# Patient Record
Sex: MALE | Race: BLACK OR AFRICAN AMERICAN | NOT HISPANIC OR LATINO | Employment: FULL TIME | URBAN - METROPOLITAN AREA
[De-identification: names, ages, dates, MRNs, and addresses within clinical notes are randomized per-mention and may not be internally consistent; named-entity substitution may affect disease eponyms.]

---

## 2019-07-29 ENCOUNTER — TELEPHONE (OUTPATIENT)
Dept: GASTROENTEROLOGY | Facility: AMBULARY SURGERY CENTER | Age: 54
End: 2019-07-29

## 2019-07-29 NOTE — TELEPHONE ENCOUNTER
Called patient lvm that needs to call in and reschedule appointment with Dr Savi Chin on 9/30 at 4:40  Told patient there is availability the following week with Dr Savi Chin

## 2019-10-07 ENCOUNTER — OFFICE VISIT (OUTPATIENT)
Dept: GASTROENTEROLOGY | Facility: AMBULARY SURGERY CENTER | Age: 54
End: 2019-10-07
Payer: COMMERCIAL

## 2019-10-07 VITALS
DIASTOLIC BLOOD PRESSURE: 80 MMHG | WEIGHT: 270 LBS | HEART RATE: 90 BPM | BODY MASS INDEX: 39.99 KG/M2 | RESPIRATION RATE: 16 BRPM | HEIGHT: 69 IN | SYSTOLIC BLOOD PRESSURE: 132 MMHG | TEMPERATURE: 98.9 F

## 2019-10-07 DIAGNOSIS — Z80.0 FAMILY HISTORY OF COLON CANCER: Primary | ICD-10-CM

## 2019-10-07 PROCEDURE — 99203 OFFICE O/P NEW LOW 30 MIN: CPT | Performed by: INTERNAL MEDICINE

## 2019-10-07 RX ORDER — CINNAMON BARK
1 POWDER (GRAM) MISCELLANEOUS DAILY
COMMUNITY

## 2019-10-07 RX ORDER — CHLORAL HYDRATE 500 MG
1 CAPSULE ORAL 2 TIMES DAILY
COMMUNITY

## 2019-10-07 RX ORDER — ALBUTEROL SULFATE 90 UG/1
AEROSOL, METERED RESPIRATORY (INHALATION)
COMMUNITY
Start: 2019-09-19

## 2019-10-07 RX ORDER — AMLODIPINE BESYLATE AND BENAZEPRIL HYDROCHLORIDE 5; 10 MG/1; MG/1
1 CAPSULE ORAL DAILY
COMMUNITY
Start: 2019-09-19

## 2019-10-07 RX ORDER — ATORVASTATIN CALCIUM 40 MG/1
TABLET, FILM COATED ORAL
COMMUNITY
Start: 2019-09-19

## 2019-10-07 RX ORDER — TURMERIC 400 MG
1 CAPSULE ORAL DAILY
COMMUNITY

## 2019-10-07 RX ORDER — DIPHENOXYLATE HYDROCHLORIDE AND ATROPINE SULFATE 2.5; .025 MG/1; MG/1
1 TABLET ORAL DAILY
COMMUNITY
End: 2019-12-16 | Stop reason: ALTCHOICE

## 2019-10-07 NOTE — ASSESSMENT & PLAN NOTE
Patient is at increased risk for colon cancer screening because of family history  Rule out colorectal lesions including polyps or malignancy     -Schedule for colonoscopy  -High-fiber diet     -Patient was given instructions about the colonoscopy prep     -Patient was explained about  the risks and benefits of the procedure  Risks including but not limited to bleeding, infection, perforation were explained in detail  Also explained about less than 100% sensitivity with the exam and other alternatives

## 2019-12-02 ENCOUNTER — ANESTHESIA EVENT (OUTPATIENT)
Dept: GASTROENTEROLOGY | Facility: AMBULARY SURGERY CENTER | Age: 54
End: 2019-12-02

## 2019-12-16 ENCOUNTER — HOSPITAL ENCOUNTER (OUTPATIENT)
Dept: GASTROENTEROLOGY | Facility: AMBULARY SURGERY CENTER | Age: 54
Setting detail: OUTPATIENT SURGERY
Discharge: HOME | End: 2019-12-16
Attending: INTERNAL MEDICINE | Admitting: INTERNAL MEDICINE
Payer: COMMERCIAL

## 2019-12-16 ENCOUNTER — ANESTHESIA (OUTPATIENT)
Dept: GASTROENTEROLOGY | Facility: AMBULARY SURGERY CENTER | Age: 54
End: 2019-12-16

## 2019-12-16 VITALS
TEMPERATURE: 98.2 F | HEART RATE: 90 BPM | RESPIRATION RATE: 18 BRPM | OXYGEN SATURATION: 99 % | DIASTOLIC BLOOD PRESSURE: 68 MMHG | SYSTOLIC BLOOD PRESSURE: 159 MMHG | WEIGHT: 263 LBS | BODY MASS INDEX: 38.95 KG/M2 | HEIGHT: 69 IN

## 2019-12-16 DIAGNOSIS — Z80.0 FAMILY HISTORY OF COLON CANCER: ICD-10-CM

## 2019-12-16 LAB — GLUCOSE SERPL-MCNC: 99 MG/DL (ref 65–140)

## 2019-12-16 PROCEDURE — 45380 COLONOSCOPY AND BIOPSY: CPT | Performed by: INTERNAL MEDICINE

## 2019-12-16 PROCEDURE — 88305 TISSUE EXAM BY PATHOLOGIST: CPT | Performed by: PATHOLOGY

## 2019-12-16 PROCEDURE — 82948 REAGENT STRIP/BLOOD GLUCOSE: CPT

## 2019-12-16 RX ORDER — PROPOFOL 10 MG/ML
INJECTION, EMULSION INTRAVENOUS AS NEEDED
Status: DISCONTINUED | OUTPATIENT
Start: 2019-12-16 | End: 2019-12-16 | Stop reason: SURG

## 2019-12-16 RX ORDER — LIDOCAINE HYDROCHLORIDE 10 MG/ML
INJECTION, SOLUTION EPIDURAL; INFILTRATION; INTRACAUDAL; PERINEURAL AS NEEDED
Status: DISCONTINUED | OUTPATIENT
Start: 2019-12-16 | End: 2019-12-16 | Stop reason: SURG

## 2019-12-16 RX ORDER — SODIUM CHLORIDE, SODIUM LACTATE, POTASSIUM CHLORIDE, CALCIUM CHLORIDE 600; 310; 30; 20 MG/100ML; MG/100ML; MG/100ML; MG/100ML
125 INJECTION, SOLUTION INTRAVENOUS CONTINUOUS
Status: DISCONTINUED | OUTPATIENT
Start: 2019-12-16 | End: 2019-12-20 | Stop reason: HOSPADM

## 2019-12-16 RX ORDER — LABETALOL 20 MG/4 ML (5 MG/ML) INTRAVENOUS SYRINGE
AS NEEDED
Status: DISCONTINUED | OUTPATIENT
Start: 2019-12-16 | End: 2019-12-16 | Stop reason: SURG

## 2019-12-16 RX ADMIN — LIDOCAINE HYDROCHLORIDE 50 MG: 10 INJECTION, SOLUTION EPIDURAL; INFILTRATION; INTRACAUDAL; PERINEURAL at 09:53

## 2019-12-16 RX ADMIN — PROPOFOL 50 MG: 10 INJECTION, EMULSION INTRAVENOUS at 09:54

## 2019-12-16 RX ADMIN — PROPOFOL 50 MG: 10 INJECTION, EMULSION INTRAVENOUS at 09:57

## 2019-12-16 RX ADMIN — PROPOFOL 50 MG: 10 INJECTION, EMULSION INTRAVENOUS at 10:03

## 2019-12-16 RX ADMIN — LABETALOL 20 MG/4 ML (5 MG/ML) INTRAVENOUS SYRINGE 5 MG: at 10:03

## 2019-12-16 RX ADMIN — SODIUM CHLORIDE, SODIUM LACTATE, POTASSIUM CHLORIDE, AND CALCIUM CHLORIDE 125 ML/HR: .6; .31; .03; .02 INJECTION, SOLUTION INTRAVENOUS at 09:24

## 2019-12-16 RX ADMIN — Medication 40 MG: at 09:58

## 2019-12-16 RX ADMIN — PROPOFOL 50 MG: 10 INJECTION, EMULSION INTRAVENOUS at 10:00

## 2019-12-16 RX ADMIN — PROPOFOL 100 MG: 10 INJECTION, EMULSION INTRAVENOUS at 09:53

## 2019-12-16 NOTE — ANESTHESIA POSTPROCEDURE EVALUATION
Post-Op Assessment Note    CV Status:  Stable    Pain management: adequate     Mental Status:  Alert and awake   Hydration Status:  Euvolemic   PONV Controlled:  Controlled   Airway Patency:  Patent   Post Op Vitals Reviewed: Yes      Staff: CRNA           BP   134/65   Temp      Pulse  86   Resp   18   SpO2   100

## 2019-12-16 NOTE — H&P
History and Physical -  [facility]  Migdalia Richards 47 y o  male MRN: [id number]        HPI:  72-year-old male with history of diabetes mellitus, hypertension was referred for colonoscopy his father had colon cancer  He had a colonoscopy more than 10 years ago  Regular bowel movements  Historical Information   Past Medical History:   Diagnosis Date    CPAP (continuous positive airway pressure) dependence     Diabetes mellitus ([address] )     Hyperlipidemia     Hypertension     Seasonal allergies     Sleep apnea      Past Surgical History:   Procedure Laterality Date    COLONOSCOPY       Social History   Social History     Substance and Sexual Activity   Alcohol Use Not Currently     Social History     Substance and Sexual Activity   Drug Use Never     Social History     Tobacco Use   Smoking Status Never Smoker   Smokeless Tobacco Never Used     Family History   Problem Relation Age of Onset    Colon cancer Father        Meds/Allergies       (Not in a hospital admission)    No Known Allergies    Objective     Blood pressure 161/86, pulse 86, temperature 97 9 °F (36 6 °C), resp  rate 18, height 5' 9" (1 753 m), weight 119 kg (263 lb), SpO2 97 %      PHYSICAL EXAM:    Gen: NAD  CV: S1 & S2 normal, RRR  CHEST: Clear to auscultate  ABD: soft, NT/ND, good bowel sounds  EXT: no edema    ASSESSMENT:     Family history of colon cancer    PLAN:    Colonoscopy

## 2019-12-16 NOTE — ANESTHESIA PREPROCEDURE EVALUATION
Review of Systems/Medical History  Patient summary reviewed  Chart reviewed      Cardiovascular  Exercise tolerance (METS): >4,  Hyperlipidemia, Hypertension controlled,    Pulmonary  Sleep apnea CPAP,        GI/Hepatic    No GERD , Bowel prep       Negative  ROS        Endo/Other  Diabetes well controlled Oral agent,   Obesity    GYN       Hematology  Negative hematology ROS      Musculoskeletal  Negative musculoskeletal ROS        Neurology  Negative neurology ROS      Psychology   Negative psychology ROS              Physical Exam    Airway    Mallampati score: II  TM Distance: >3 FB  Neck ROM: full     Dental   No notable dental hx     Cardiovascular  Cardiovascular exam normal    Pulmonary  Pulmonary exam normal     Other Findings        Anesthesia Plan  ASA Score- 2     Anesthesia Type- IV sedation with anesthesia with ASA Monitors  Additional Monitors:   Airway Plan:         Plan Factors-Patient not instructed to abstain from smoking on day of procedure  Patient did not smoke on day of surgery  Induction- intravenous  Postoperative Plan-     Informed Consent- Anesthetic plan and risks discussed with patient  I personally reviewed this patient with the CRNA  Discussed and agreed on the Anesthesia Plan with the CRNA  Darletta Pac

## 2019-12-24 ENCOUNTER — TELEPHONE (OUTPATIENT)
Dept: GASTROENTEROLOGY | Facility: AMBULARY SURGERY CENTER | Age: 54
End: 2019-12-24

## 2019-12-24 NOTE — TELEPHONE ENCOUNTER
----- Message from Brissa Holliday MD sent at 12/23/2019  6:56 PM EST -----  Couple of colon polyps removed came back as benign  1 polyp came back as pre cancerous tubular adenoma      Repeat colonoscopy in 5 years

## 2024-09-20 ENCOUNTER — TELEPHONE (OUTPATIENT)
Dept: GASTROENTEROLOGY | Facility: AMBULARY SURGERY CENTER | Age: 59
End: 2024-09-20

## 2024-09-27 ENCOUNTER — TELEPHONE (OUTPATIENT)
Age: 59
End: 2024-09-27

## 2024-09-27 VITALS — WEIGHT: 265 LBS | BODY MASS INDEX: 37.94 KG/M2 | HEIGHT: 70 IN

## 2024-09-27 NOTE — TELEPHONE ENCOUNTER
09/27/24  Screened by: Rosenda Silva MA    Referring Provider     Pre- Screening:     Body mass index is 38.02 kg/m².  Has patient been referred for a routine screening Colonoscopy? yes  Is the patient between 45-75 years old? yes      Previous Colonoscopy yes   If yes:    Date: 2019 Dr Estrada    Facility:     Reason:         Does the patient want to see a Gastroenterologist prior to their procedure OR are they having any GI symptoms? no    Has the patient been hospitalized or had abdominal surgery in the past 6 months? no    Does the patient use supplemental oxygen? no    Does the patient take Coumadin, Lovenox, Plavix, Elliquis, Xarelto, or other blood thinning medication? no    Has the patient had a stroke, cardiac event, or stent placed in the past year? no      If patient is between 45yrs - 49yrs, please advise patient that we will have to confirm benefits & coverage with their insurance company for a routine screening colonoscopy.

## 2024-09-27 NOTE — TELEPHONE ENCOUNTER
Scheduled date of colonoscopy (as of today):12/20/2024  Physician performing colonoscopy:Dr Estrada  Location of colonoscopy:[facility]  Bowel prep reviewed with patient:miralax/dul  Instructions reviewed with patient by:sent via email  Clearances: n/a

## 2024-12-06 ENCOUNTER — ANESTHESIA EVENT (OUTPATIENT)
Dept: ANESTHESIOLOGY | Facility: HOSPITAL | Age: 59
End: 2024-12-06

## 2024-12-06 ENCOUNTER — ANESTHESIA (OUTPATIENT)
Dept: ANESTHESIOLOGY | Facility: HOSPITAL | Age: 59
End: 2024-12-06

## 2024-12-12 ENCOUNTER — TELEPHONE (OUTPATIENT)
Dept: GASTROENTEROLOGY | Facility: CLINIC | Age: 59
End: 2024-12-12

## 2024-12-12 NOTE — TELEPHONE ENCOUNTER
lmom confirming pt's colonoscopy scheduled on 12/20/24 at [facility] with Dr Estrada.  Informed [facility] would be calling the day prior with the arrival time.  Advised pt to hold Jardiance 4 days prior to the procedure. Informed of clear liquid diet day prior as well as the bowel cleansing preparation.  Informed would need a  the day of the procedure due to being under sedation. I asked pt to please call back to confirm.

## 2024-12-16 NOTE — TELEPHONE ENCOUNTER
Spoke to pt confirming pt's colonoscopy scheduled on 12/20/24 at [facility] with Dr Estrada.  Informed [facility] would be calling the day prior with the arrival time.  Advised pt to hold Jardiance 4 days prior to the procedure. Pt has instructions and did not have any questions.

## 2024-12-20 ENCOUNTER — ANESTHESIA (OUTPATIENT)
Dept: GASTROENTEROLOGY | Facility: AMBULARY SURGERY CENTER | Age: 59
End: 2024-12-20
Payer: COMMERCIAL

## 2024-12-20 ENCOUNTER — HOSPITAL ENCOUNTER (OUTPATIENT)
Dept: GASTROENTEROLOGY | Facility: AMBULARY SURGERY CENTER | Age: 59
Setting detail: OUTPATIENT SURGERY
End: 2024-12-20
Attending: INTERNAL MEDICINE
Payer: COMMERCIAL

## 2024-12-20 VITALS
SYSTOLIC BLOOD PRESSURE: 128 MMHG | WEIGHT: 258 LBS | HEIGHT: 68 IN | OXYGEN SATURATION: 96 % | RESPIRATION RATE: 18 BRPM | DIASTOLIC BLOOD PRESSURE: 75 MMHG | HEART RATE: 90 BPM | TEMPERATURE: 98.1 F | BODY MASS INDEX: 39.1 KG/M2

## 2024-12-20 DIAGNOSIS — Z80.0 FAMILY HISTORY OF COLON CANCER: ICD-10-CM

## 2024-12-20 DIAGNOSIS — Z86.0100 HISTORY OF COLON POLYPS: ICD-10-CM

## 2024-12-20 LAB — GLUCOSE SERPL-MCNC: 138 MG/DL (ref 65–140)

## 2024-12-20 PROCEDURE — 45385 COLONOSCOPY W/LESION REMOVAL: CPT | Performed by: INTERNAL MEDICINE

## 2024-12-20 PROCEDURE — 82948 REAGENT STRIP/BLOOD GLUCOSE: CPT

## 2024-12-20 PROCEDURE — 88305 TISSUE EXAM BY PATHOLOGIST: CPT | Performed by: STUDENT IN AN ORGANIZED HEALTH CARE EDUCATION/TRAINING PROGRAM

## 2024-12-20 RX ORDER — PROPOFOL 10 MG/ML
INJECTION, EMULSION INTRAVENOUS AS NEEDED
Status: DISCONTINUED | OUTPATIENT
Start: 2024-12-20 | End: 2024-12-20

## 2024-12-20 RX ORDER — LIDOCAINE HYDROCHLORIDE 10 MG/ML
INJECTION, SOLUTION EPIDURAL; INFILTRATION; INTRACAUDAL; PERINEURAL AS NEEDED
Status: DISCONTINUED | OUTPATIENT
Start: 2024-12-20 | End: 2024-12-20

## 2024-12-20 RX ORDER — GLYCOPYRROLATE 0.2 MG/ML
INJECTION INTRAMUSCULAR; INTRAVENOUS AS NEEDED
Status: DISCONTINUED | OUTPATIENT
Start: 2024-12-20 | End: 2024-12-20

## 2024-12-20 RX ORDER — SODIUM CHLORIDE, SODIUM LACTATE, POTASSIUM CHLORIDE, CALCIUM CHLORIDE 600; 310; 30; 20 MG/100ML; MG/100ML; MG/100ML; MG/100ML
INJECTION, SOLUTION INTRAVENOUS CONTINUOUS PRN
Status: DISCONTINUED | OUTPATIENT
Start: 2024-12-20 | End: 2024-12-20

## 2024-12-20 RX ADMIN — PROPOFOL 50 MG: 10 INJECTION, EMULSION INTRAVENOUS at 11:04

## 2024-12-20 RX ADMIN — GLYCOPYRROLATE 0.2 MCG: 0.2 INJECTION INTRAMUSCULAR; INTRAVENOUS at 11:01

## 2024-12-20 RX ADMIN — Medication 40 MG: at 11:08

## 2024-12-20 RX ADMIN — PROPOFOL 50 MG: 10 INJECTION, EMULSION INTRAVENOUS at 11:05

## 2024-12-20 RX ADMIN — PROPOFOL 50 MG: 10 INJECTION, EMULSION INTRAVENOUS at 11:07

## 2024-12-20 RX ADMIN — PROPOFOL 50 MG: 10 INJECTION, EMULSION INTRAVENOUS at 11:06

## 2024-12-20 RX ADMIN — SODIUM CHLORIDE, SODIUM LACTATE, POTASSIUM CHLORIDE, AND CALCIUM CHLORIDE: .6; .31; .03; .02 INJECTION, SOLUTION INTRAVENOUS at 10:30

## 2024-12-20 RX ADMIN — PROPOFOL 100 MG: 10 INJECTION, EMULSION INTRAVENOUS at 11:03

## 2024-12-20 RX ADMIN — PROPOFOL 50 MG: 10 INJECTION, EMULSION INTRAVENOUS at 11:08

## 2024-12-20 RX ADMIN — LIDOCAINE HYDROCHLORIDE 50 MG: 10 INJECTION, SOLUTION EPIDURAL; INFILTRATION; INTRACAUDAL at 11:03

## 2024-12-20 NOTE — H&P
History and Physical -  [facility]  Camilo Richards 59 y.o. male MRN: [id number]        HPI: 59-year-old male with history of colon polyps.  Regular bowel movements.    Historical Information   Past Medical History:   Diagnosis Date    Colon polyp     CPAP (continuous positive airway pressure) dependence     Diabetes mellitus (HCC)     Hyperlipidemia     Hypertension     Seasonal allergies     Sleep apnea      Past Surgical History:   Procedure Laterality Date    COLONOSCOPY       Social History   Social History     Substance and Sexual Activity   Alcohol Use Not Currently     Social History     Substance and Sexual Activity   Drug Use Never     Social History     Tobacco Use   Smoking Status Never   Smokeless Tobacco Never     Family History   Problem Relation Age of Onset    Colon cancer Father        Meds/Allergies     Not in a hospital admission.    Allergies   Allergen Reactions    Amlodipine Swelling    Benazepril Swelling       Objective     There were no vitals taken for this visit.    Physical Exam:    Chest- CTA  Heart- RRR  Abdomen- NT/ND  Extremities- No edema    ASSESSMENT:     History of colon polyps    PLAN:    Colonoscopy

## 2024-12-20 NOTE — ANESTHESIA PREPROCEDURE EVALUATION
Procedure:  COLONOSCOPY    Relevant Problems   ANESTHESIA (within normal limits)      CARDIO   (+) Hyperlipidemia   (+) Hypertension      PULMONARY   (+) Sleep apnea      Endocrine   (+) Diabetes mellitus (HCC)      Other   (+) CPAP (continuous positive airway pressure) dependence        Physical Exam    Airway    Mallampati score: II  TM Distance: >3 FB  Neck ROM: full     Dental   No notable dental hx     Cardiovascular      Pulmonary      Other Findings        Anesthesia Plan  ASA Score- 3     Anesthesia Type- IV sedation with anesthesia with ASA Monitors.         Additional Monitors:     Airway Plan:            Plan Factors-Exercise tolerance (METS): >4 METS.    Chart reviewed.   Existing labs reviewed. Patient summary reviewed.    Patient is not a current smoker.              Induction- intravenous.    Postoperative Plan-         Informed Consent- Anesthetic plan and risks discussed with patient.  I personally reviewed this patient with the CRNA. Discussed and agreed on the Anesthesia Plan with the CRNA..

## 2024-12-20 NOTE — ANESTHESIA POSTPROCEDURE EVALUATION
Post-Op Assessment Note    CV Status:  Stable  Pain Score: 0    Pain management: adequate       Mental Status:  Alert   Hydration Status:  Stable   PONV Controlled:  None   Airway Patency:  Patent     Post Op Vitals Reviewed: Yes    No anethesia notable event occurred.    Staff: CRNA           Last Filed PACU Vitals:  Vitals Value Taken Time   Temp 98.1 °F (36.7 °C) 12/20/24 1119   Pulse 97 12/20/24 1119   /64 12/20/24 1119   Resp 18 12/20/24 1119   SpO2 97 % 12/20/24 1119       Modified Kaitlyn:  Activity: 2 (12/20/2024 10:18 AM)  Respiration: 2 (12/20/2024 10:18 AM)  Circulation: 2 (12/20/2024 10:18 AM)  Consciousness: 2 (12/20/2024 10:18 AM)  Oxygen Saturation: 2 (12/20/2024 10:18 AM)  Modified Kaitlyn Score: 10 (12/20/2024 10:18 AM)

## 2024-12-23 PROCEDURE — 88305 TISSUE EXAM BY PATHOLOGIST: CPT | Performed by: STUDENT IN AN ORGANIZED HEALTH CARE EDUCATION/TRAINING PROGRAM

## 2024-12-24 ENCOUNTER — RESULTS FOLLOW-UP (OUTPATIENT)
Dept: GASTROENTEROLOGY | Facility: CLINIC | Age: 59
End: 2024-12-24